# Patient Record
Sex: FEMALE | Race: WHITE | NOT HISPANIC OR LATINO | Employment: OTHER | URBAN - METROPOLITAN AREA
[De-identification: names, ages, dates, MRNs, and addresses within clinical notes are randomized per-mention and may not be internally consistent; named-entity substitution may affect disease eponyms.]

---

## 2023-05-27 PROBLEM — I10 HYPERTENSION: Status: ACTIVE | Noted: 2023-05-27

## 2023-05-27 PROBLEM — F32.A DEPRESSION: Status: ACTIVE | Noted: 2023-05-27

## 2023-05-27 PROBLEM — F41.1 GAD (GENERALIZED ANXIETY DISORDER): Status: ACTIVE | Noted: 2023-05-27

## 2023-05-27 PROBLEM — F03.B3 MODERATE DEMENTIA WITH MOOD DISTURBANCE (MULTI): Status: ACTIVE | Noted: 2023-05-27

## 2023-05-27 PROBLEM — J44.9 CHRONIC OBSTRUCTIVE PULMONARY DISEASE (MULTI): Status: ACTIVE | Noted: 2023-05-27

## 2023-05-27 PROBLEM — G20.C PARKINSONISM (MULTI): Status: ACTIVE | Noted: 2023-05-27

## 2024-01-28 ENCOUNTER — NURSING HOME VISIT (OUTPATIENT)
Dept: POST ACUTE CARE | Facility: EXTERNAL LOCATION | Age: 79
End: 2024-01-28
Payer: MEDICARE

## 2024-01-28 DIAGNOSIS — E44.0 MODERATE PROTEIN-CALORIE MALNUTRITION (MULTI): ICD-10-CM

## 2024-01-28 DIAGNOSIS — Z51.5 HOSPICE PROGRAM CARE: ICD-10-CM

## 2024-01-28 DIAGNOSIS — G30.1 SEVERE LATE ONSET ALZHEIMER'S DEMENTIA WITHOUT BEHAVIORAL DISTURBANCE, PSYCHOTIC DISTURBANCE, MOOD DISTURBANCE, OR ANXIETY (MULTI): Primary | ICD-10-CM

## 2024-01-28 DIAGNOSIS — R26.2 AMBULATORY DYSFUNCTION: ICD-10-CM

## 2024-01-28 DIAGNOSIS — F02.C0 SEVERE LATE ONSET ALZHEIMER'S DEMENTIA WITHOUT BEHAVIORAL DISTURBANCE, PSYCHOTIC DISTURBANCE, MOOD DISTURBANCE, OR ANXIETY (MULTI): Primary | ICD-10-CM

## 2024-01-28 DIAGNOSIS — Z66 DNR (DO NOT RESUSCITATE): ICD-10-CM

## 2024-01-28 PROCEDURE — 99308 SBSQ NF CARE LOW MDM 20: CPT | Performed by: FAMILY MEDICINE

## 2024-01-28 NOTE — PROGRESS NOTES
Documentation at [facility]    Problem List Items Addressed This Visit       Severe late onset Alzheimer's dementia without behavioral disturbance, psychotic disturbance, mood disturbance, or anxiety (CMS/HCC) - Primary    Moderate protein-calorie malnutrition (CMS/HCC)    Hospice program care    DNR (do not resuscitate)    Ambulatory dysfunction     Weight loss as anticipated due to late effects of Alzheimer Dementia

## 2024-01-28 NOTE — LETTER
Patient: Ada Ragland  : 1945    Encounter Date: 2024    Documentation at [facility]    Problem List Items Addressed This Visit       Severe late onset Alzheimer's dementia without behavioral disturbance, psychotic disturbance, mood disturbance, or anxiety (CMS/HCC) - Primary    Moderate protein-calorie malnutrition (CMS/HCC)    Hospice program care    DNR (do not resuscitate)    Ambulatory dysfunction     Weight loss as anticipated due to late effects of Alzheimer Dementia      Electronically Signed By: Vladimir Francois DO   24 11:34 AM

## 2024-02-27 ENCOUNTER — HOSPITAL ENCOUNTER (EMERGENCY)
Facility: HOSPITAL | Age: 79
Discharge: HOME | End: 2024-02-28
Attending: STUDENT IN AN ORGANIZED HEALTH CARE EDUCATION/TRAINING PROGRAM
Payer: MEDICARE

## 2024-02-27 DIAGNOSIS — J96.01 ACUTE RESPIRATORY FAILURE WITH HYPOXIA (MULTI): Primary | ICD-10-CM

## 2024-02-27 DIAGNOSIS — R00.0 TACHYCARDIA: ICD-10-CM

## 2024-02-27 DIAGNOSIS — R41.82 ALTERED MENTAL STATUS, UNSPECIFIED ALTERED MENTAL STATUS TYPE: ICD-10-CM

## 2024-02-27 PROCEDURE — 99284 EMERGENCY DEPT VISIT MOD MDM: CPT | Performed by: STUDENT IN AN ORGANIZED HEALTH CARE EDUCATION/TRAINING PROGRAM

## 2024-02-27 PROCEDURE — 2500000005 HC RX 250 GENERAL PHARMACY W/O HCPCS

## 2024-02-27 RX ADMIN — Medication 5 L/MIN: at 20:15

## 2024-02-27 ASSESSMENT — LIFESTYLE VARIABLES
EVER FELT BAD OR GUILTY ABOUT YOUR DRINKING: NO
HAVE YOU EVER FELT YOU SHOULD CUT DOWN ON YOUR DRINKING: NO
HAVE PEOPLE ANNOYED YOU BY CRITICIZING YOUR DRINKING: NO

## 2024-02-27 ASSESSMENT — COLUMBIA-SUICIDE SEVERITY RATING SCALE - C-SSRS
1. IN THE PAST MONTH, HAVE YOU WISHED YOU WERE DEAD OR WISHED YOU COULD GO TO SLEEP AND NOT WAKE UP?: NO
2. HAVE YOU ACTUALLY HAD ANY THOUGHTS OF KILLING YOURSELF?: NO
6. HAVE YOU EVER DONE ANYTHING, STARTED TO DO ANYTHING, OR PREPARED TO DO ANYTHING TO END YOUR LIFE?: NO

## 2024-02-27 ASSESSMENT — PAIN SCALES - GENERAL: PAINLEVEL_OUTOF10: 0 - NO PAIN

## 2024-02-27 ASSESSMENT — PAIN - FUNCTIONAL ASSESSMENT: PAIN_FUNCTIONAL_ASSESSMENT: 0-10

## 2024-02-28 VITALS
SYSTOLIC BLOOD PRESSURE: 133 MMHG | HEART RATE: 93 BPM | TEMPERATURE: 98.5 F | RESPIRATION RATE: 16 BRPM | BODY MASS INDEX: 25.83 KG/M2 | HEIGHT: 65 IN | WEIGHT: 155 LBS | DIASTOLIC BLOOD PRESSURE: 62 MMHG | OXYGEN SATURATION: 97 %

## 2024-02-28 NOTE — ED TRIAGE NOTES
Pt DNRCC comes in for unresponsive/AMS, pt normally Aox4, LKW 1830, pt now non verbal, responsive to sternal rub, pt 80% on RA per EMS, fever per Ems, Blood sugar 206 per EMS  
English

## 2024-02-28 NOTE — ED PROVIDER NOTES
HPI   Chief Complaint   Patient presents with    Altered Mental Status       Patient is a 78-year-old female with Parkinson's dementia, COPD, hypertension presenting to the emergency room for altered mental status and hypoxia.  Patient lives in a nursing facility, [facility].  She had an episode of vomiting earlier today.  Later in the evening, they found her unresponsive and short of breath.  They placed her on supplemental oxygen.  She is DNR comfort care only.  They called her son, who is her healthcare power of , and he did want the patient evaluated in the emergency department.  On arrival to the emergency department, patient is breathing and on supple oxygen, but unresponsive.  We are unable to obtain history from the patient and information is obtained from EMS and nursing facility paperwork.      History provided by:  EMS personnel and medical records  History limited by:  Mental status change                      [place] Coma Scale Score: 10                     Patient History   Past Medical History:   Diagnosis Date    COPD (chronic obstructive pulmonary disease) (CMS/[facility])     HTN (hypertension)     Personal history of diseases of the skin and subcutaneous tissue     History of psoriasis    Personal history of other diseases of the circulatory system     History of hypertension     Past Surgical History:   Procedure Laterality Date    ADENOIDECTOMY  07/17/2017    Adenoidectomy    CATARACT EXTRACTION  01/17/2018    Cataract Extraction    DILATION AND CURETTAGE OF UTERUS  07/17/2017    Dilation And Curettage    OTHER SURGICAL HISTORY  07/05/2021    Intracranial aneurysm repair    TONSILLECTOMY  07/17/2017    Tonsillectomy     No family history on file.  Social History     Tobacco Use    Smoking status: Unknown    Smokeless tobacco: Not on file   Substance Use Topics    Alcohol use: Not Currently    Drug use: Yes     Types: Morphine     Comment: morphine prn end of life care       Physical Exam   ED  Triage Vitals [02/27/24 1949]   Temperature Heart Rate Respirations BP   37.8 °C (100 °F) (!) 124 12 143/68      Pulse Ox Temp Source Heart Rate Source Patient Position   94 % Temporal -- Lying      BP Location FiO2 (%)     Right arm --       Physical Exam  Vitals and nursing note reviewed.   Constitutional:       Appearance: She is well-developed.      Comments: Unresponsive.   HENT:      Head: Normocephalic and atraumatic.      Right Ear: External ear normal.      Left Ear: External ear normal.      Nose: Nose normal.      Mouth/Throat:      Mouth: Mucous membranes are moist.   Eyes:      General: No scleral icterus.     Conjunctiva/sclera: Conjunctivae normal.      Pupils: Pupils are equal, round, and reactive to light.   Cardiovascular:      Rate and Rhythm: Regular rhythm. Tachycardia present.      Heart sounds: No murmur heard.  Pulmonary:      Effort: No respiratory distress.      Breath sounds: Rhonchi present.   Abdominal:      Palpations: Abdomen is soft.      Tenderness: There is no abdominal tenderness.   Musculoskeletal:         General: No swelling.      Cervical back: Neck supple.   Skin:     General: Skin is warm and dry.   Neurological:      Comments: Opens eyes and moans to noxious stimuli, does not withdraw.         ED Course & MDM   Diagnoses as of 02/28/24 0123   Altered mental status, unspecified altered mental status type   Acute respiratory failure with hypoxia (CMS/HCC)   Tachycardia       Medical Decision Making  Patient is a 78-year-old female presents emergency department for hypoxia and altered mental status.  On arrival to the emergency department, patient is tachycardic in the 120s, on supplemental oxygen in the 90s.  Borderline febrile at 37.8 °C.  No hypotension.  Patient moans and open eyes to noxious stimuli, but does not withdraw.  She has confirmed DO NOT RESUSCITATE comfort care only paperwork with her.  Patient is kept on the supple oxygen for comfort.  Her son, Alphonse, was  promptly called.  He is aware of the patient being sent to the emergency department.  Her CODE STATUS was confirmed.  He would like for us to keep her comfortable but not initiate additional life-prolonging interventions such as radiology imaging, blood work, IV medications at this time.  He is agreeable with meeting with hospice.  Patient has been on hospice previously but did have some improvement and was taken off of hospice.  We contacted [facility], who will be able to send out a nurse tonight, but they are currently busy with nurse visits with other hospice patients.  They will send 1 out as soon as possible.    Patient was signed out to incoming physician pending intake by hospice nurse.    Dionisio Handley DO, PGY 3  Emergency Medicine Resident    Diagnoses:  Acute respiratory failure with hypoxia (CMS/[facility])  (primary encounter diagnosis)  Altered mental status, unspecified altered mental status type  Tachycardia         Procedure  Procedures     Dionisio Handley DO  Resident  02/28/24 7525

## 2024-02-28 NOTE — NURSING NOTE
Hospice meeting scheduled for 930/10a this morning bedside with [facility] RN    Family canceled meeting with [facility], they would like pt to return to [facility] and have [facility]. [facility] notified and signing consents with family now. Pt can then discharge back to facility with hospice. Resident notified

## 2024-02-28 NOTE — DISCHARGE INSTRUCTIONS
You are to follow-up with the hospice at the [facility].  Feel free to return to the emergency department at any time if your needs change.

## 2024-02-28 NOTE — ED NOTES
2100--Updated son at bedside of plan of care, hospice awaiting to see patient, family has no needs at this time.      2230--updated son that hospice is on the way       Yoly Mack RN  02/27/24 1320

## 2024-09-10 ENCOUNTER — NURSING HOME VISIT (OUTPATIENT)
Dept: POST ACUTE CARE | Facility: EXTERNAL LOCATION | Age: 79
End: 2024-09-10
Payer: MEDICARE

## 2024-09-10 DIAGNOSIS — I11.0 HYPERTENSIVE HEART DISEASE WITH HEART FAILURE (MULTI): ICD-10-CM

## 2024-09-10 DIAGNOSIS — G20.C PARKINSONISM, UNSPECIFIED PARKINSONISM TYPE (MULTI): ICD-10-CM

## 2024-09-10 DIAGNOSIS — D69.6 THROMBOCYTOPENIA, UNSPECIFIED (CMS-HCC): ICD-10-CM

## 2024-09-10 DIAGNOSIS — G95.9 CERVICAL MYELOPATHY (MULTI): ICD-10-CM

## 2024-09-10 DIAGNOSIS — F03.B3 MODERATE DEMENTIA WITH MOOD DISTURBANCE, UNSPECIFIED DEMENTIA TYPE (MULTI): Primary | ICD-10-CM

## 2024-09-10 DIAGNOSIS — K74.60 CIRRHOSIS OF LIVER WITHOUT ASCITES, UNSPECIFIED HEPATIC CIRRHOSIS TYPE (MULTI): ICD-10-CM

## 2024-09-10 DIAGNOSIS — E44.0 MODERATE PROTEIN-CALORIE MALNUTRITION (MULTI): ICD-10-CM

## 2024-09-10 DIAGNOSIS — Z66 DNR (DO NOT RESUSCITATE): ICD-10-CM

## 2024-09-10 DIAGNOSIS — F32.A DEPRESSION, UNSPECIFIED DEPRESSION TYPE: ICD-10-CM

## 2024-09-10 DIAGNOSIS — Z51.5 HOSPICE PROGRAM CARE: ICD-10-CM

## 2024-09-10 DIAGNOSIS — I72.0 CAROTID ARTERY ANEURYSM (CMS-HCC): ICD-10-CM

## 2024-09-10 PROBLEM — Z79.4 LONG TERM (CURRENT) USE OF INSULIN (MULTI): Status: ACTIVE | Noted: 2024-09-10

## 2024-09-10 PROBLEM — F33.1 MODERATE EPISODE OF RECURRENT MAJOR DEPRESSIVE DISORDER (MULTI): Status: ACTIVE | Noted: 2024-09-10

## 2024-09-10 PROBLEM — Z79.4 LONG TERM (CURRENT) USE OF INSULIN (MULTI): Status: RESOLVED | Noted: 2024-09-10 | Resolved: 2024-09-10

## 2024-09-10 PROCEDURE — 99308 SBSQ NF CARE LOW MDM 20: CPT | Performed by: FAMILY MEDICINE

## 2024-09-10 NOTE — LETTER
Patient: Ada Ragland  : 1945    Encounter Date: 09/10/2024    Documentation at [facility]    Problem List Items Addressed This Visit       Moderate dementia with mood disturbance (Multi) - Primary    Depression    Parkinsonism (Multi)    Moderate protein-calorie malnutrition (Multi)    Hospice program care    DNR (do not resuscitate)    Cervical myelopathy (Multi)    Cirrhosis of liver without ascites, unspecified hepatic cirrhosis type (Multi)    Hypertensive heart disease with heart failure (Multi)    Carotid artery aneurysm (CMS-HCC)    Thrombocytopenia, unspecified (CMS-HCC)     Bedbound/nonambulatory but with good vocabulary.  Dependent for most ADLs.  12 pound weight loss as anticipated since 2023.      Electronically Signed By: Vladimir Francois DO   9/10/24  1:24 PM

## 2024-09-10 NOTE — PROGRESS NOTES
Documentation at [facility]    Problem List Items Addressed This Visit       Moderate dementia with mood disturbance (Multi) - Primary    Depression    Parkinsonism (Multi)    Moderate protein-calorie malnutrition (Multi)    Hospice program care    DNR (do not resuscitate)    Cervical myelopathy (Multi)    Cirrhosis of liver without ascites, unspecified hepatic cirrhosis type (Multi)    Hypertensive heart disease with heart failure (Multi)    Carotid artery aneurysm (CMS-HCC)    Thrombocytopenia, unspecified (CMS-HCC)     Bedbound/nonambulatory but with good vocabulary.  Dependent for most ADLs.  12 pound weight loss as anticipated since September 2023.

## 2025-04-15 ENCOUNTER — NURSING HOME VISIT (OUTPATIENT)
Dept: POST ACUTE CARE | Facility: EXTERNAL LOCATION | Age: 80
End: 2025-04-15
Payer: MEDICARE

## 2025-04-15 DIAGNOSIS — J44.9 CHRONIC OBSTRUCTIVE PULMONARY DISEASE, UNSPECIFIED COPD TYPE (MULTI): ICD-10-CM

## 2025-04-15 DIAGNOSIS — F41.1 GAD (GENERALIZED ANXIETY DISORDER): ICD-10-CM

## 2025-04-15 DIAGNOSIS — G30.1 SEVERE LATE ONSET ALZHEIMER'S DEMENTIA WITHOUT BEHAVIORAL DISTURBANCE, PSYCHOTIC DISTURBANCE, MOOD DISTURBANCE, OR ANXIETY (MULTI): Primary | ICD-10-CM

## 2025-04-15 DIAGNOSIS — F02.C0 SEVERE LATE ONSET ALZHEIMER'S DEMENTIA WITHOUT BEHAVIORAL DISTURBANCE, PSYCHOTIC DISTURBANCE, MOOD DISTURBANCE, OR ANXIETY (MULTI): Primary | ICD-10-CM

## 2025-04-15 DIAGNOSIS — I11.0 HYPERTENSIVE HEART DISEASE WITH HEART FAILURE: ICD-10-CM

## 2025-04-15 DIAGNOSIS — Z51.5 HOSPICE PROGRAM CARE: ICD-10-CM

## 2025-04-15 NOTE — LETTER
Patient: Ada Ragland  : 1945    Encounter Date: 04/15/2025    Documentation at [facility]    Problem List Items Addressed This Visit       Chronic obstructive pulmonary disease (Multi)    STEVE (generalized anxiety disorder)    Severe late onset Alzheimer's dementia without behavioral disturbance, psychotic disturbance, mood disturbance, or anxiety (Multi) - Primary    Hospice program care    Hypertensive heart disease with heart failure     Continue Hospice comfort care    Electronically Signed By: Vladimir Francois DO   25  1:16 PM

## 2025-04-18 NOTE — PROGRESS NOTES
Documentation at [facility]    Problem List Items Addressed This Visit       Chronic obstructive pulmonary disease (Multi)    STEVE (generalized anxiety disorder)    Severe late onset Alzheimer's dementia without behavioral disturbance, psychotic disturbance, mood disturbance, or anxiety (Multi) - Primary    Hospice program care    Hypertensive heart disease with heart failure     Continue Hospice comfort care